# Patient Record
Sex: FEMALE | Race: WHITE | Employment: FULL TIME | URBAN - METROPOLITAN AREA
[De-identification: names, ages, dates, MRNs, and addresses within clinical notes are randomized per-mention and may not be internally consistent; named-entity substitution may affect disease eponyms.]

---

## 2017-01-15 ENCOUNTER — HOSPITAL ENCOUNTER (OUTPATIENT)
Age: 67
Discharge: HOME | End: 2017-01-15
Attending: FAMILY MEDICINE
Payer: MEDICARE

## 2017-01-15 VITALS
RESPIRATION RATE: 19 BRPM | WEIGHT: 230 LBS | TEMPERATURE: 98 F | OXYGEN SATURATION: 99 % | HEIGHT: 65 IN | HEART RATE: 88 BPM | SYSTOLIC BLOOD PRESSURE: 143 MMHG | DIASTOLIC BLOOD PRESSURE: 73 MMHG | BODY MASS INDEX: 38.32 KG/M2

## 2017-01-15 DIAGNOSIS — N30.01 ACUTE CYSTITIS WITH HEMATURIA: Primary | ICD-10-CM

## 2017-01-15 LAB
URINE BILIRUBIN: NEGATIVE
URINE GLUCOSE: NEGATIVE MG/DL
URINE KETONES: NEGATIVE MG/DL
URINE NITRITE: NEGATIVE
URINE PH: 5
URINE SPECIFIC GRAVITY: 1.01
URINE UROBILINOGEN: 0.2 MG/DL

## 2017-01-15 PROCEDURE — 87086 URINE CULTURE/COLONY COUNT: CPT | Performed by: FAMILY MEDICINE

## 2017-01-15 PROCEDURE — 81002 URINALYSIS NONAUTO W/O SCOPE: CPT

## 2017-01-15 PROCEDURE — 99214 OFFICE O/P EST MOD 30 MIN: CPT

## 2017-01-15 RX ORDER — NITROFURANTOIN 25; 75 MG/1; MG/1
100 CAPSULE ORAL 2 TIMES DAILY
Qty: 14 CAPSULE | Refills: 0 | Status: SHIPPED | OUTPATIENT
Start: 2017-01-15 | End: 2017-01-22

## 2017-01-15 NOTE — ED INITIAL ASSESSMENT (HPI)
1/14 c/o dysuria, frequency, and urgency. No hematuria. No fever. Denies abdominal or back pain. No nausea/vomiting.

## 2017-01-15 NOTE — ED PROVIDER NOTES
Patient Seen in: [facility]    History   Patient presents with:  Urinary Symptoms (urologic)    Stated Complaint: UTI  HPI    Patient here with dysuria, urgency and frequency for 1-2 days.   Denies vaginal itching, discharge DX/THERAPEUTIC SUBSTANCE, EPIDURAL/SUBARACHNOID; LUMBAR/SACRAL N/A 8/2/2016    Comment Procedure: LUMBAR EPIDURAL;  Surgeon: Cb Lamas MD;  Location: [address] [place] [address] FOR IV ANTIBIOTIC SURGICAL SIT 165.1 cm (5' 5\")  Wt 104. 327 kg  BMI 38.27 kg/m2  SpO2 99%    GENERAL: overweight habitus, well hydrated, appears comfortable  HEENT:  PERRLA, EOMI, MMM  NECK: supple, no adenopathy  LUNGS:  b/l cta with good air entry  CARDIO: RRR without murmur  GI:  So

## 2017-04-07 PROBLEM — M25.552 ACUTE PAIN OF LEFT HIP: Status: ACTIVE | Noted: 2017-04-07

## 2017-09-28 PROBLEM — M16.12 PRIMARY OSTEOARTHRITIS OF LEFT HIP: Status: ACTIVE | Noted: 2017-09-28

## 2017-09-28 PROBLEM — M17.12 PRIMARY LOCALIZED OSTEOARTHROSIS OF LEFT LOWER LEG: Status: ACTIVE | Noted: 2017-09-28

## 2018-02-14 PROBLEM — G89.29 CHRONIC LEFT-SIDED LOW BACK PAIN WITH LEFT-SIDED SCIATICA: Status: ACTIVE | Noted: 2018-02-14

## 2018-02-14 PROBLEM — M54.42 CHRONIC LEFT-SIDED LOW BACK PAIN WITH LEFT-SIDED SCIATICA: Status: ACTIVE | Noted: 2018-02-14

## 2018-04-17 PROBLEM — Z01.818 PREOPERATIVE EVALUATION TO RULE OUT SURGICAL CONTRAINDICATION: Status: ACTIVE | Noted: 2018-04-17

## 2018-04-24 ENCOUNTER — HOSPITAL (OUTPATIENT)
Dept: OTHER | Age: 68
End: 2018-04-24
Attending: ORTHOPAEDIC SURGERY

## 2018-04-24 LAB
INR PPP: 1
PROTHROMBIN TIME: 10.4 SECONDS (ref 9.7–11.8)
PROTHROMBIN TIME: NORMAL

## 2018-04-25 LAB
ANALYZER ANC (IANC): ABNORMAL
ANION GAP SERPL CALC-SCNC: 10 MMOL/L (ref 10–20)
BUN SERPL-MCNC: 9 MG/DL (ref 6–20)
BUN/CREAT SERPL: 16 (ref 7–25)
CALCIUM SERPL-MCNC: 8.5 MG/DL (ref 8.4–10.2)
CHLORIDE: 104 MMOL/L (ref 98–107)
CO2 SERPL-SCNC: 29 MMOL/L (ref 21–32)
CREAT SERPL-MCNC: 0.58 MG/DL (ref 0.51–0.95)
ERYTHROCYTE [DISTWIDTH] IN BLOOD: 13.8 % (ref 11–15)
GLUCOSE SERPL-MCNC: 103 MG/DL (ref 65–99)
HEMATOCRIT: 28.8 % (ref 36–46.5)
HGB BLD-MCNC: 9.4 GM/DL (ref 12–15.5)
MCH RBC QN AUTO: 28.4 PG (ref 26–34)
MCHC RBC AUTO-ENTMCNC: 32.6 GM/DL (ref 32–36.5)
MCV RBC AUTO: 87 FL (ref 78–100)
PERCENT NRBC: ABNORMAL
PLATELET # BLD: 263 THOUSAND/MCL (ref 140–450)
POTASSIUM SERPL-SCNC: 4.2 MMOL/L (ref 3.4–5.1)
RBC # BLD: 3.31 MILLION/MCL (ref 4–5.2)
SODIUM SERPL-SCNC: 139 MMOL/L (ref 135–145)
WBC # BLD: 9.3 THOUSAND/MCL (ref 4.2–11)

## 2018-05-08 PROCEDURE — 87077 CULTURE AEROBIC IDENTIFY: CPT | Performed by: NURSE PRACTITIONER

## 2018-05-08 PROCEDURE — 87186 SC STD MICRODIL/AGAR DIL: CPT | Performed by: NURSE PRACTITIONER

## 2018-05-08 PROCEDURE — 87086 URINE CULTURE/COLONY COUNT: CPT | Performed by: NURSE PRACTITIONER

## 2018-06-07 PROBLEM — Z96.642 HISTORY OF TOTAL LEFT HIP REPLACEMENT: Status: ACTIVE | Noted: 2018-06-07

## 2018-06-18 PROBLEM — G89.29 CHRONIC LEFT HIP PAIN: Status: ACTIVE | Noted: 2018-06-18

## 2018-06-18 PROBLEM — M25.552 CHRONIC LEFT HIP PAIN: Status: ACTIVE | Noted: 2018-06-18

## 2023-08-02 ENCOUNTER — HOSPITAL ENCOUNTER (OUTPATIENT)
Age: 73
Discharge: HOME | End: 2023-08-02
Attending: EMERGENCY MEDICINE
Payer: MEDICARE

## 2023-08-02 VITALS
HEART RATE: 95 BPM | TEMPERATURE: 98 F | SYSTOLIC BLOOD PRESSURE: 175 MMHG | RESPIRATION RATE: 19 BRPM | DIASTOLIC BLOOD PRESSURE: 97 MMHG | OXYGEN SATURATION: 95 %

## 2023-08-02 DIAGNOSIS — I10 HYPERTENSION, UNSPECIFIED TYPE: ICD-10-CM

## 2023-08-02 DIAGNOSIS — N30.01 ACUTE CYSTITIS WITH HEMATURIA: Primary | ICD-10-CM

## 2023-08-02 LAB
BILIRUB UR QL STRIP: NEGATIVE
COLOR UR: YELLOW
GLUCOSE UR STRIP-MCNC: NEGATIVE MG/DL
KETONES UR STRIP-MCNC: NEGATIVE MG/DL
NITRITE UR QL STRIP: POSITIVE
PH UR STRIP: 5 [PH]
PROT UR STRIP-MCNC: 100 MG/DL
SP GR UR STRIP: 1.02
UROBILINOGEN UR STRIP-ACNC: <2 MG/DL

## 2023-08-02 PROCEDURE — 87186 SC STD MICRODIL/AGAR DIL: CPT | Performed by: EMERGENCY MEDICINE

## 2023-08-02 PROCEDURE — 87086 URINE CULTURE/COLONY COUNT: CPT | Performed by: EMERGENCY MEDICINE

## 2023-08-02 PROCEDURE — 99204 OFFICE O/P NEW MOD 45 MIN: CPT

## 2023-08-02 PROCEDURE — 81002 URINALYSIS NONAUTO W/O SCOPE: CPT

## 2023-08-02 PROCEDURE — 87077 CULTURE AEROBIC IDENTIFY: CPT | Performed by: EMERGENCY MEDICINE

## 2023-08-02 RX ORDER — NITROFURANTOIN 25; 75 MG/1; MG/1
100 CAPSULE ORAL 2 TIMES DAILY
Qty: 14 CAPSULE | Refills: 0 | Status: SHIPPED | OUTPATIENT
Start: 2023-08-02 | End: 2023-08-09

## 2023-09-21 ENCOUNTER — HOSPITAL ENCOUNTER (OUTPATIENT)
Age: 73
Discharge: HOME | End: 2023-09-21
Attending: EMERGENCY MEDICINE
Payer: MEDICARE

## 2023-09-21 VITALS
RESPIRATION RATE: 18 BRPM | SYSTOLIC BLOOD PRESSURE: 151 MMHG | TEMPERATURE: 98 F | DIASTOLIC BLOOD PRESSURE: 80 MMHG | OXYGEN SATURATION: 91 % | HEART RATE: 89 BPM

## 2023-09-21 DIAGNOSIS — N30.00 ACUTE CYSTITIS WITHOUT HEMATURIA: Primary | ICD-10-CM

## 2023-09-21 LAB
BILIRUB UR QL STRIP: NEGATIVE
COLOR UR: YELLOW
GLUCOSE UR STRIP-MCNC: NEGATIVE MG/DL
KETONES UR STRIP-MCNC: NEGATIVE MG/DL
NITRITE UR QL STRIP: POSITIVE
PH UR STRIP: 5.5 [PH]
PROT UR STRIP-MCNC: 30 MG/DL
SP GR UR STRIP: 1.02
UROBILINOGEN UR STRIP-ACNC: <2 MG/DL

## 2023-09-21 PROCEDURE — 81002 URINALYSIS NONAUTO W/O SCOPE: CPT

## 2023-09-21 PROCEDURE — 99213 OFFICE O/P EST LOW 20 MIN: CPT

## 2023-09-21 RX ORDER — SULFAMETHOXAZOLE AND TRIMETHOPRIM 800; 160 MG/1; MG/1
1 TABLET ORAL 2 TIMES DAILY
Qty: 6 TABLET | Refills: 0 | Status: SHIPPED | OUTPATIENT
Start: 2023-09-21 | End: 2023-09-24

## 2023-09-21 RX ORDER — PHENAZOPYRIDINE HYDROCHLORIDE 200 MG/1
200 TABLET, FILM COATED ORAL 3 TIMES DAILY PRN
Qty: 6 TABLET | Refills: 0 | Status: SHIPPED | OUTPATIENT
Start: 2023-09-21 | End: 2023-09-23

## 2023-09-21 NOTE — ED INITIAL ASSESSMENT (HPI)
Patient arrived ambulatory to room c/o symptoms that started yesterday. +burning with urinating +frequency/urgency. No hematuria. No n/v/d. No fevers. No abdominal pain/back pain. Easy non labored respirations. No distress.

## 2024-02-15 ENCOUNTER — HOSPITAL ENCOUNTER (OUTPATIENT)
Age: 74
Discharge: HOME | End: 2024-02-15
Payer: MEDICARE

## 2024-02-15 VITALS
HEART RATE: 102 BPM | OXYGEN SATURATION: 98 % | TEMPERATURE: 99 F | RESPIRATION RATE: 26 BRPM | SYSTOLIC BLOOD PRESSURE: 165 MMHG | DIASTOLIC BLOOD PRESSURE: 85 MMHG

## 2024-02-15 DIAGNOSIS — N30.00 ACUTE CYSTITIS WITHOUT HEMATURIA: Primary | ICD-10-CM

## 2024-02-15 LAB
BILIRUB UR QL STRIP: NEGATIVE
CLARITY UR: CLEAR
COLOR UR: YELLOW
GLUCOSE UR STRIP-MCNC: NEGATIVE MG/DL
KETONES UR STRIP-MCNC: NEGATIVE MG/DL
NITRITE UR QL STRIP: NEGATIVE
PH UR STRIP: 5.5 [PH]
PROT UR STRIP-MCNC: NEGATIVE MG/DL
SP GR UR STRIP: <=1.005
UROBILINOGEN UR STRIP-ACNC: <2 MG/DL

## 2024-02-15 PROCEDURE — 81002 URINALYSIS NONAUTO W/O SCOPE: CPT

## 2024-02-15 PROCEDURE — 99213 OFFICE O/P EST LOW 20 MIN: CPT

## 2024-02-15 PROCEDURE — 99214 OFFICE O/P EST MOD 30 MIN: CPT

## 2024-02-15 RX ORDER — SULFAMETHOXAZOLE AND TRIMETHOPRIM 800; 160 MG/1; MG/1
1 TABLET ORAL 2 TIMES DAILY
Qty: 6 TABLET | Refills: 0 | Status: SHIPPED | OUTPATIENT
Start: 2024-02-15 | End: 2024-02-18

## 2024-02-15 NOTE — ED PROVIDER NOTES
Patient Seen in: [facility]      History     Chief Complaint   Patient presents with    Urinary Symptoms     Stated Complaint: Bladder infection    Subjective:   HPI    74 yo female presenting with concern for UTI. Reports dysuria, urgency and frequency started this AM. Denies fevers, abdominal pain, back pain. Pt feels symptoms are similar to previous UTI.    Objective:   Past Medical History:   Diagnosis Date    Irregular heartbeat     OBESITY     Pulmonary fibrosis (HCC)               Past Surgical History:   Procedure Laterality Date    COLONOSCOPY      FLUOR GID & LOCLZJ NDL/CATH SPI DX/THER NJX N/A 7/27/2015    Procedure: LUMBAR EPIDURAL;  Surgeon: Gustavo Ireland MD;  Location: [facility] FOR PAIN MANAGEMENT    FLUOR GID & LOCLZJ NDL/CATH SPI DX/THER NJX N/A 10/22/2015    Procedure: LUMBAR EPIDURAL;  Surgeon: Gustavo Ireland MD;  Location: [facility]    HIP REPLACEMENT SURGERY Left 04/24/2018    Dr Hayden    INJECTION, W/WO CONTRAST, DX/THERAPEUTIC SUBSTANCE, EPIDURAL/SUBARACHNOID; LUMBAR/SACRAL N/A 7/27/2015    Procedure: LUMBAR EPIDURAL;  Surgeon: Gustavo Ireland MD;  Location: [facility]    INJECTION, W/WO CONTRAST, DX/THERAPEUTIC SUBSTANCE, EPIDURAL/SUBARACHNOID; LUMBAR/SACRAL N/A 10/22/2015    Procedure: LUMBAR EPIDURAL;  Surgeon: Gustavo Ireland MD;  Location: [facility]    INJECTION, W/WO CONTRAST, DX/THERAPEUTIC SUBSTANCE, EPIDURAL/SUBARACHNOID; LUMBAR/SACRAL N/A 7/1/2016    Procedure: LUMBAR EPIDURAL;  Surgeon: Gustavo Ireland MD;  Location: [facility]    INJECTION, W/WO CONTRAST, DX/THERAPEUTIC SUBSTANCE, EPIDURAL/SUBARACHNOID; LUMBAR/SACRAL N/A 8/2/2016    Procedure: LUMBAR EPIDURAL;  Surgeon: Gustavo Ireland MD;  Location: [facility]    PATIENT DOCUMENTED NOT TO HAVE EXPERIENCED ANY OF THE FOLLOWING EVENTS N/A 7/1/2016    Procedure: LUMBAR EPIDURAL;  Surgeon: Gustavo Ireland MD;   Location: [facility] FOR PAIN MANA[facility]    PATIENT DOCUMENTED NOT TO HAVE EXPERIENCED ANY OF THE FOLLOWING EVENTS N/A 8/2/2016    Procedure: LUMBAR EPIDURAL;  Surgeon: Gustavo Ireland MD;  Location: [facility] PAIN MANAGEMENT    PATIENT WITHOUGH PREOPERATIVE ORDER FOR IV ANTIBIOTIC SURGICAL SITE INFECTION PROPHYLAXIS. N/A 7/1/2016    Procedure: LUMBAR EPIDURAL;  Surgeon: Gustavo Ireland MD;  Location: [facility] PAIN MANAGEMENT    PATIENT WITHOUGH PREOPERATIVE ORDER FOR IV ANTIBIOTIC SURGICAL SITE INFECTION PROPHYLAXIS. N/A 8/2/2016    Procedure: LUMBAR EPIDURAL;  Surgeon: Gustavo Ireland MD;  Location: [facility] PAIN MANAGEMENT                Social History     Socioeconomic History    Marital status:    Tobacco Use    Smoking status: Never    Smokeless tobacco: Never   Vaping Use    Vaping Use: Every day   Substance and Sexual Activity    Alcohol use: No    Drug use: No              Review of Systems    Positive for stated complaint: Bladder infection  Other systems are as noted in HPI.  Constitutional and vital signs reviewed.      All other systems reviewed and negative except as noted above.    Physical Exam     ED Triage Vitals [02/15/24 0911]   BP (!) 165/85   Pulse 102   Resp 26   Temp 98.5 °F (36.9 °C)   Temp src Temporal   SpO2 92 %   O2 Device None (Room air)       Current:BP (!) 165/85   Pulse 102   Temp 98.5 °F (36.9 °C) (Temporal)   Resp 26   SpO2 98%         Physical Exam  Vitals and nursing note reviewed.   Constitutional:       General: She is not in acute distress.  HENT:      Head: Normocephalic and atraumatic.      Right Ear: External ear normal.      Left Ear: External ear normal.      Nose: Nose normal.      Mouth/Throat:      Mouth: Mucous membranes are moist.   Eyes:      Extraocular Movements: Extraocular movements intact.      Pupils: Pupils are equal, round, and reactive to light.   Cardiovascular:      Rate and Rhythm: Normal rate.   Pulmonary:      Effort:  Pulmonary effort is normal.   Abdominal:      General: Abdomen is flat.   Musculoskeletal:         General: Normal range of motion.      Cervical back: Normal range of motion.   Skin:     General: Skin is warm.   Neurological:      General: No focal deficit present.      Mental Status: She is alert and oriented to person, place, and time.   Psychiatric:         Mood and Affect: Mood normal.         Behavior: Behavior normal.               ED Course     Labs Reviewed   [facility] POCT URINALYSIS DIPSTICK - Abnormal; Notable for the following components:       Result Value    Blood, Urine Small (*)     Leukocyte esterase urine Moderate (*)     All other components within normal limits         74 yo female presenting with c/o urinary symptoms.  UA with small blood, moderate leuks  Ddx-interstitial cystitis, UTI, pyelonephritis    Dc with bactrim (pt tolerated previously). Return precautions reviewed           MDM                                         Medical Decision Making      Disposition and Plan     Clinical Impression:  1. Acute cystitis without hematuria         Disposition:  Discharge  2/15/2024 10:00 am    Follow-up:  No follow-up provider specified.        Medications Prescribed:  Discharge Medication List as of 2/15/2024 10:00 AM        START taking these medications    Details   sulfamethoxazole-trimethoprim -160 MG Oral Tab per tablet Take 1 tablet by mouth 2 (two) times daily for 3 days., Normal, Disp-6 tablet, R-0

## 2024-12-18 ENCOUNTER — HOSPITAL ENCOUNTER (OUTPATIENT)
Age: 74
Discharge: HOME | End: 2024-12-18
Payer: MEDICARE

## 2024-12-18 VITALS
TEMPERATURE: 97 F | DIASTOLIC BLOOD PRESSURE: 87 MMHG | SYSTOLIC BLOOD PRESSURE: 155 MMHG | OXYGEN SATURATION: 98 % | HEART RATE: 98 BPM | RESPIRATION RATE: 22 BRPM

## 2024-12-18 DIAGNOSIS — N30.01 ACUTE CYSTITIS WITH HEMATURIA: Primary | ICD-10-CM

## 2024-12-18 LAB
BILIRUB UR QL STRIP: NEGATIVE
COLOR UR: YELLOW
GLUCOSE UR STRIP-MCNC: NEGATIVE MG/DL
KETONES UR STRIP-MCNC: 15 MG/DL
NITRITE UR QL STRIP: POSITIVE
PH UR STRIP: 5.5 [PH]
PROT UR STRIP-MCNC: >=300 MG/DL
SP GR UR STRIP: 1.02
UROBILINOGEN UR STRIP-ACNC: <2 MG/DL

## 2024-12-18 PROCEDURE — 99214 OFFICE O/P EST MOD 30 MIN: CPT

## 2024-12-18 PROCEDURE — 81002 URINALYSIS NONAUTO W/O SCOPE: CPT

## 2024-12-18 PROCEDURE — 87186 SC STD MICRODIL/AGAR DIL: CPT | Performed by: NURSE PRACTITIONER

## 2024-12-18 PROCEDURE — 87086 URINE CULTURE/COLONY COUNT: CPT | Performed by: NURSE PRACTITIONER

## 2024-12-18 PROCEDURE — 87088 URINE BACTERIA CULTURE: CPT | Performed by: NURSE PRACTITIONER

## 2024-12-18 RX ORDER — NITROFURANTOIN 25; 75 MG/1; MG/1
100 CAPSULE ORAL 2 TIMES DAILY
Qty: 14 CAPSULE | Refills: 0 | Status: SHIPPED | OUTPATIENT
Start: 2024-12-18 | End: 2024-12-25

## 2024-12-18 NOTE — DISCHARGE INSTRUCTIONS
As discussed, it does appear that you have a bladder infection.  Antibiotic sent to the pharmacy.  Twice a day for 7 days.  Drink plenty water.  It is harder for infection Krob waterfall.    I have sent your urine for culture.  We will have results in about 48 hours.  Some will contact you if antibiotics need to be switched.    If you have any worsening urinary symptoms, blood in your urine, inability to urinate, worsening back pain with associated fevers, chills, fatigue, nausea, vomiting, please go to emergency room.

## 2024-12-18 NOTE — ED INITIAL ASSESSMENT (HPI)
Patient concerned for bladder infection.  Reports back pain, dysuria starting yesterday.  Denies fevers.  Hx of pulmonary fibrosis.

## 2024-12-18 NOTE — ED PROVIDER NOTES
Patient Seen in: [facility]      History     Chief Complaint   Patient presents with    Urinary Symptoms            Stated Complaint: Urinary problem    Subjective: This is a 74-year-old female, past medical history of pulmonary fibrosis (home O2 as needed), presents to immediate care clinic for evaluation of urinary frequency and dysuria.  Positive low back pain.  Patient denies abdominal pain, pelvic pain, flank pain.  No retention or hematuria.  No nausea, vomiting, [place].  No fever, chills, fatigue.  Patient states this feels similar to previous UTIs.  AOx4.  The history is provided by the patient.           Objective:     Past Medical History:    Irregular heartbeat    OBESITY    Pulmonary fibrosis (HCC)              Past Surgical History:   Procedure Laterality Date    Colonoscopy      Fluor gid & loclzj ndl/cath spi dx/ther njx N/A 7/27/2015    Procedure: LUMBAR EPIDURAL;  Surgeon: Gustavo Ireland MD;  Location: [facility] PAIN MANA[facility]    Fluor gid & loclzj ndl/cath spi dx/ther njx N/A 10/22/2015    Procedure: LUMBAR EPIDURAL;  Surgeon: Gustavo Ireland MD;  Location: [facility]IN [facility]NA[facility]    Hip replacement surgery Left 04/24/2018    Dr Hayden    Injection, w/wo contrast, dx/therapeutic substance, epidural/subarachnoid; lumbar/sacral N/A 7/27/2015    Procedure: LUMBAR EPIDURAL;  Surgeon: Gustavo Ireland MD;  Location: [facility] PAIN MANAGEMENT    Injection, w/wo contrast, dx/therapeutic substance, epidural/subarachnoid; lumbar/sacral N/A 10/22/2015    Procedure: LUMBAR EPIDURAL;  Surgeon: Gustavo Ireland MD;  Location: [facility] PAIN MANAGEMENT    Injection, w/wo contrast, dx/therapeutic substance, epidural/subarachnoid; lumbar/sacral N/A 7/1/2016    Procedure: LUMBAR EPIDURAL;  Surgeon: Gustavo Ireland MD;  Location: [facility]IN [facility]    Injection, w/wo contrast, dx/therapeutic substance, epidural/subarachnoid; lumbar/sacral N/A 8/2/2016    Procedure: LUMBAR  EPIDURAL;  Surgeon: Gustavo Ireland MD;  Location: [facility] PAIN MANA[facility]    Patient documented not to have experienced any of the following events N/A 7/1/2016    Procedure: LUMBAR EPIDURAL;  Surgeon: Gustavo Ireland MD;  Location: [facility] PAIN MANAGEMENT    Patient documented not to have experienced any of the following events N/A 8/2/2016    Procedure: LUMBAR EPIDURAL;  Surgeon: Gustavo Ireland MD;  Location: [facility] PAIN MANAGEMENT    Patient withough preoperative order for iv antibiotic surgical site infection prophylaxis. N/A 7/1/2016    Procedure: LUMBAR EPIDURAL;  Surgeon: Gustavo Ireland MD;  Location: [facility] PAIN [facility]    Patient withough preoperative order for iv antibiotic surgical site infection prophylaxis. N/A 8/2/2016    Procedure: LUMBAR EPIDURAL;  Surgeon: Gustavo Ireland MD;  Location: [facility]                No pertinent social history.            Review of Systems   Constitutional: Negative.  Negative for activity change, appetite change, chills, fatigue and fever.   HENT: Negative.     Respiratory: Negative.     Cardiovascular: Negative.    Gastrointestinal: Negative.  Negative for abdominal pain.   Genitourinary:  Positive for dysuria and urgency. Negative for decreased urine volume, difficulty urinating, dyspareunia, enuresis, flank pain, frequency, genital sores, hematuria, menstrual problem, pelvic pain, vaginal bleeding, vaginal discharge and vaginal pain.   Musculoskeletal:  Positive for back pain.   Skin: Negative.    Neurological: Negative.        Positive for stated complaint: Urinary problem  Other systems are as noted in HPI.  Constitutional and vital signs reviewed.      All other systems reviewed and negative except as noted above.    Physical Exam     ED Triage Vitals [12/18/24 1115]   /87   Pulse 98   Resp (!) 28   Temp 97.3 °F (36.3 °C)   Temp src Oral   SpO2 92 %   O2 Device Nasal cannula       Current Vitals:    Vital Signs  BP: 155/87  Pulse: 98  Resp: 22  Temp: 97.3 °F (36.3 °C)  Temp src: Oral    Oxygen Therapy  SpO2: 98 %  O2 Device: Nasal cannula  O2 Flow Rate (L/min): 2 L/min        Physical Exam  Constitutional:       General: She is not in acute distress.     Appearance: Normal appearance. She is not ill-appearing or toxic-appearing.   HENT:      Head: Normocephalic.      Jaw: There is normal jaw occlusion.      Right Ear: External ear normal.      Left Ear: External ear normal.      Nose: Nose normal. No congestion or rhinorrhea.      Mouth/Throat:      Lips: Pink. No lesions.      Mouth: Mucous membranes are moist. No oral lesions.      Tongue: No lesions.      Palate: No lesions.      Pharynx: Uvula midline. No pharyngeal swelling, oropharyngeal exudate, posterior oropharyngeal erythema, uvula swelling or postnasal drip.   Eyes:      General:         Right eye: No discharge.         Left eye: No discharge.      Extraocular Movements: Extraocular movements intact.      Pupils: Pupils are equal, round, and reactive to light.   Cardiovascular:      Rate and Rhythm: Normal rate and regular rhythm.      Pulses: Normal pulses.      Heart sounds: Normal heart sounds.   Pulmonary:      Effort: Pulmonary effort is normal.      Breath sounds: Normal breath sounds.   Abdominal:      General: Abdomen is flat. Bowel sounds are normal. There is no distension.      Palpations: Abdomen is soft.      Tenderness: There is no abdominal tenderness. There is no right CVA tenderness, left CVA tenderness, guarding or rebound.   Musculoskeletal:         General: Normal range of motion.      Cervical back: Normal range of motion.   Skin:     General: Skin is warm.      Capillary Refill: Capillary refill takes less than 2 seconds.   Neurological:      General: No focal deficit present.      Mental Status: She is alert and oriented to person, place, and time.             ED Course     Labs Reviewed   [facility] POCT URINALYSIS DIPSTICK -  Abnormal; Notable for the following components:       Result Value    Urine Clarity Cloudy (*)     Protein urine >=300 (*)     Ketone, Urine 15 (*)     Blood, Urine Large (*)     Nitrite Urine Positive (*)     Leukocyte esterase urine Small (*)     All other components within normal limits   URINE CULTURE, ROUTINE                   MDM      Differentials considered: Acute cystitis with hematuria, acute cystitis without hematuria, pyelonephritis.     Patient with no complaints of abdominal pain, flank pain, pelvic pain.  Bilateral lower back pain.  Afebrile.  No nausea or vomiting.  No urinary retention or hematuria.  Very low suspicion for pyelonephritis.  No suprapubic assessment on exam.  No suprapubic discomfort on palpation.  No CVA tenderness to percussion.    Patient urine sample provided with small amount of leukocytes, positive nitrites, positive blood.  Small amount of ketones and protein.    Educated patient on acute cystitis with hematuria.  Previous urine culture from last year grew out E. coli.  Patient allergic to penicillins.  No known knowledge if she is tolerated cephalosporins in the past.  Chart does not yield any results.  Patient is unsure of herself.    Will prescribe Macrobid twice a day for 7 days.    Patient is aware to drink plenty water and get plenty of rest.    Educated patient on signs symptoms that warrant immediate ER evaluation.  She verbalized understand agrees with plan of care.                Medical Decision Making      Disposition and Plan     Clinical Impression:  1. Acute cystitis with hematuria         Disposition:  Discharge  12/18/2024 11:47 am    Follow-up:  Radha Mehta DO  [address] [place] [id number]  [phone]      As needed          Medications Prescribed:  Discharge Medication List as of 12/18/2024 11:47 AM        START taking these medications    Details   nitrofurantoin monohydrate macro 100 MG Oral Cap Take 1 capsule (100 mg total) by mouth 2  (two) times daily for 7 days., Normal, Disp-14 capsule, R-0                 Supplementary Documentation:

## 2025-03-17 ENCOUNTER — HOSPITAL ENCOUNTER (OUTPATIENT)
Age: 75
Discharge: HOME | End: 2025-03-17
Attending: STUDENT IN AN ORGANIZED HEALTH CARE EDUCATION/TRAINING PROGRAM
Payer: MEDICARE

## 2025-03-17 VITALS
OXYGEN SATURATION: 93 % | RESPIRATION RATE: 24 BRPM | DIASTOLIC BLOOD PRESSURE: 87 MMHG | TEMPERATURE: 97 F | HEART RATE: 82 BPM | SYSTOLIC BLOOD PRESSURE: 150 MMHG

## 2025-03-17 DIAGNOSIS — N30.00 ACUTE CYSTITIS WITHOUT HEMATURIA: Primary | ICD-10-CM

## 2025-03-17 LAB
BILIRUB UR QL STRIP: NEGATIVE
COLOR UR: YELLOW
GLUCOSE UR STRIP-MCNC: NEGATIVE MG/DL
KETONES UR STRIP-MCNC: 15 MG/DL
NITRITE UR QL STRIP: NEGATIVE
PH UR STRIP: 5.5 [PH]
PROT UR STRIP-MCNC: 100 MG/DL
SP GR UR STRIP: 1.02
UROBILINOGEN UR STRIP-ACNC: <2 MG/DL

## 2025-03-17 PROCEDURE — 99214 OFFICE O/P EST MOD 30 MIN: CPT

## 2025-03-17 PROCEDURE — 87186 SC STD MICRODIL/AGAR DIL: CPT | Performed by: STUDENT IN AN ORGANIZED HEALTH CARE EDUCATION/TRAINING PROGRAM

## 2025-03-17 PROCEDURE — 87088 URINE BACTERIA CULTURE: CPT | Performed by: STUDENT IN AN ORGANIZED HEALTH CARE EDUCATION/TRAINING PROGRAM

## 2025-03-17 PROCEDURE — 81002 URINALYSIS NONAUTO W/O SCOPE: CPT

## 2025-03-17 PROCEDURE — 87086 URINE CULTURE/COLONY COUNT: CPT | Performed by: STUDENT IN AN ORGANIZED HEALTH CARE EDUCATION/TRAINING PROGRAM

## 2025-03-17 RX ORDER — SULFAMETHOXAZOLE AND TRIMETHOPRIM 800; 160 MG/1; MG/1
1 TABLET ORAL 2 TIMES DAILY
Qty: 6 TABLET | Refills: 0 | Status: SHIPPED | OUTPATIENT
Start: 2025-03-17 | End: 2025-03-20

## 2025-03-17 NOTE — DISCHARGE INSTRUCTIONS
Your story and urine sample are consistent with a urinary tract infection for which I have prescribed antibiotics.  Symptoms should begin to improve within 72 hours on antibiotics, if there is no improvement, or if you develop any new, changing, or progressing signs or symptoms, please follow-up immediately with your primary care physician.    Even in the setting of antibiotics, infections can continue to progress.  If you notice any back pain, abdominal pain, fevers, vomiting, lethargy, dehydration, or any other concerns, please present immediately to the emergency department for assessment.

## 2025-03-17 NOTE — ED INITIAL ASSESSMENT (HPI)
Patient arrived per wheelchair to room. Symptoms started today. +pain with urinating. +urinary frequency/urgency. No hematuria. No n/v/d. No fevers. No abdominal pain/back pain.

## 2025-03-17 NOTE — ED PROVIDER NOTES
Patient Seen in: [facility]      History     Chief Complaint   Patient presents with    Urinary Symptoms     Stated Complaint: urinary issue    Subjective:   HPI      74-year-old female with past medical history of pulmonary fibrosis intermittently on home oxygen as well as only medication she reports taking daily is Ofev, not currently on her home O2, who presents with concern for dysuria since yesterday.  She denies any hematuria, back pain, fevers, abdominal pain, or vomiting.  Per chart review, patient presented with UTI on 12/20/2024 which grew greater than 100,000 CFU E. coli which was pansensitive with no resistance patterns.  Patient reports only antibiotic allergy is to penicillin.  She was initiated on Macrobid and states symptoms resolved with Macrobid.  She denies any history of recurrent UTIs.  She denies any history of kidney disease.  Per chart review from her CMP from 9/21/2022, which is her most recent CMP, she had normal kidney function.  She denies any history of kidney disease.  Per chart review from 2/15/2024, patient was treated with Bactrim for UTI.    Objective:     Past Medical History:    Irregular heartbeat    OBESITY    Pulmonary fibrosis (HCC)              Past Surgical History:   Procedure Laterality Date    Colonoscopy      Fluor gid & loclzj ndl/cath spi dx/ther njx N/A 7/27/2015    Procedure: LUMBAR EPIDURAL;  Surgeon: Gustavo Ireland MD;  Location: [facility]    Fluor gid & loclzj ndl/cath spi dx/ther njx N/A 10/22/2015    Procedure: LUMBAR EPIDURAL;  Surgeon: Gustavo Ireland MD;  Location: [facility]    Hip replacement surgery Left 04/24/2018    Dr Hayden    Injection, w/wo contrast, dx/therapeutic substance, epidural/subarachnoid; lumbar/sacral N/A 7/27/2015    Procedure: LUMBAR EPIDURAL;  Surgeon: Gustavo Ireland MD;  Location: [facility]    Injection, w/wo contrast, dx/therapeutic substance,  epidural/subarachnoid; lumbar/sacral N/A 10/22/2015    Procedure: LUMBAR EPIDURAL;  Surgeon: Gustavo Ireland MD;  Location: [facility]    Injection, w/wo contrast, dx/therapeutic substance, epidural/subarachnoid; lumbar/sacral N/A 7/1/2016    Procedure: LUMBAR EPIDURAL;  Surgeon: Gustavo Ireland MD;  Location: [facility]    Injection, w/wo contrast, dx/therapeutic substance, epidural/subarachnoid; lumbar/sacral N/A 8/2/2016    Procedure: LUMBAR EPIDURAL;  Surgeon: Gustavo Ireland MD;  Location: [facility]    Patient documented not to have experienced any of the following events N/A 7/1/2016    Procedure: LUMBAR EPIDURAL;  Surgeon: Gustavo Ireland MD;  Location: [facility]    Patient documented not to have experienced any of the following events N/A 8/2/2016    Procedure: LUMBAR EPIDURAL;  Surgeon: Gustavo Ireland MD;  Location: [facility]    Patient withough preoperative order for iv antibiotic surgical site infection prophylaxis. N/A 7/1/2016    Procedure: LUMBAR EPIDURAL;  Surgeon: Gustavo Ireland MD;  Location: [facility]    Patient withough preoperative order for iv antibiotic surgical site infection prophylaxis. N/A 8/2/2016    Procedure: LUMBAR EPIDURAL;  Surgeon: Gustavo Ireland MD;  Location: [facility]                Social History     Socioeconomic History    Marital status:    Tobacco Use    Smoking status: Never    Smokeless tobacco: Never   Vaping Use    Vaping status: Every Day   Substance and Sexual Activity    Alcohol use: No    Drug use: No              Review of Systems    Positive for stated complaint: urinary issue  Other systems are as noted in HPI.  Constitutional and vital signs reviewed.      All other systems reviewed and negative except as noted above.    Physical Exam     ED Triage Vitals [03/17/25 0958]   /87   Pulse 82   Resp 24   Temp  97.1 °F (36.2 °C)   Temp src    SpO2 93 %   O2 Device None (Room air)       Current Vitals:   Vital Signs  BP: 150/87  Pulse: 82  Resp: 24  Temp: 97.1 °F (36.2 °C)    Oxygen Therapy  SpO2: 93 %  O2 Device: None (Room air)        Physical Exam    General: Awake, alert, comfortable on room air, in no distress, tolerating oral secretions, interactive  Pulmonary: No wheezing, no conversational dyspnea, did not bring her home O2 and declined oxygen at immediate care per the nurse  GI: Abdomen soft, nontender, nondistended, no rebound, no guarding  Neuro: Symmetrical facial expressions on gross observation  Musculoskeletal: No B/L CVA tenderness  HEENT: No periorbital edema or erythema  Psych: Normal mood, normal affect    ED Course     Labs Reviewed   [facility] POCT URINALYSIS DIPSTICK - Abnormal; Notable for the following components:       Result Value    Urine Clarity Cloudy (*)     Protein urine 100 (*)     Ketone, Urine 15 (*)     Blood, Urine Moderate (*)     Leukocyte esterase urine Small (*)     All other components within normal limits   URINE CULTURE, ROUTINE       MDM   Patient is awake and alert, comfortable on room air, in no distress, afebrile, no abdominal tenderness, no CVA tenderness, no sign of septic nephrolithiasis or pyelonephritis, patient reports only symptom is dysuria, consistent with likely UTI, symptoms since yesterday, no fevers to suggest systemic symptoms  -Patient's urine dip does show small leukocyte esterases and large blood although patient denies gross hematuria, and I visually assessed the urine sample which is hazy but with no gross hematuria, urine dip is consistent with UTI  -Will send urine culture  -Only antibiotic allergy reported by the patient is penicillins, will avoid penicillins and cephalosporins  -Per chart review from 12/20/2024, which is the last time patient presented with UTI symptoms, she grew E. coli which was pansensitive, and pt states Macrobid did resolve her symptoms.   She was also assessed on 2/15/2024 for UTI symptoms, no urine culture, but was treated with Bactrim, and urine culture from 12/20/2024 does show sensitivity to Bactrim.  -Given Macrobid is bacteriostatic, and given patient's age, and recent Macrobid use, would initiate Bactrim at this time.  She denies any history of kidney disease.  Per chart review of most recent CMP from 9/21/2022 showed normal kidney function.  She reports the only daily medication she takes is Ofev, no interactions per review on up-to-date with Bactrim.  Patient also prefers different medication than Macrobid given she was on Macrobid for the most recent UTI.  -I encouraged the patient to maintain good hydration with water  -We discussed urology assessment if patient has recurrent UTIs, she declines at this time, does not feel her UTIs are recurrent.  I did encourage follow-up with her primary care physician.  -We did discuss that even in the settings of antibiotics, infections can still spread, progress, develop complications, and therefore if there is no improvement over 72 hours on antibiotics or with any new, changing, or progressing signs or symptoms, I did recommend immediate reassessment.  -Currently, no sign of pyelonephritis, but ED precautions discussed    Medical Decision Making  Amount and/or Complexity of Data Reviewed  External Data Reviewed: labs.     Details: Immediate care note and urine culture from 12/20/2024, immediate care note and urine dip from 2/15/2024, CMP from 9/21/2022  Labs: ordered.    Risk  Prescription drug management.        Disposition and Plan     Clinical Impression:  1. Acute cystitis without hematuria         Disposition:  Discharge  3/17/2025 10:29 am    Follow-up:  Buster Jiang,   [address]  [facility] [address]  [phone]    In 3 days  As needed, If symptoms worsen          Medications Prescribed:  Discharge Medication List as of 3/17/2025 10:29 AM             sulfamethoxazole-trimethoprim -160 MG Oral Tab per tablet 6 tablet 0 3/17/2025 3/20/2025   Sig:   Take 1 tablet by mouth 2 (two) times daily for 3 days.     Route:   Oral

## 2025-06-09 ENCOUNTER — HOSPITAL ENCOUNTER (OUTPATIENT)
Age: 75
Discharge: HOME | End: 2025-06-09
Payer: MEDICARE

## 2025-06-09 VITALS
DIASTOLIC BLOOD PRESSURE: 74 MMHG | TEMPERATURE: 98 F | HEART RATE: 92 BPM | RESPIRATION RATE: 24 BRPM | OXYGEN SATURATION: 92 % | SYSTOLIC BLOOD PRESSURE: 142 MMHG

## 2025-06-09 DIAGNOSIS — R31.9 URINARY TRACT INFECTION WITH HEMATURIA, SITE UNSPECIFIED: Primary | ICD-10-CM

## 2025-06-09 DIAGNOSIS — N39.0 URINARY TRACT INFECTION WITH HEMATURIA, SITE UNSPECIFIED: Primary | ICD-10-CM

## 2025-06-09 LAB
BILIRUB UR QL STRIP: NEGATIVE
COLOR UR: YELLOW
GLUCOSE UR STRIP-MCNC: NEGATIVE MG/DL
NITRITE UR QL STRIP: POSITIVE
PH UR STRIP: 5.5 [PH]
PROT UR STRIP-MCNC: >=300 MG/DL
SP GR UR STRIP: >=1.03
UROBILINOGEN UR STRIP-ACNC: <2 MG/DL

## 2025-06-09 PROCEDURE — 99213 OFFICE O/P EST LOW 20 MIN: CPT

## 2025-06-09 PROCEDURE — 81002 URINALYSIS NONAUTO W/O SCOPE: CPT

## 2025-06-09 PROCEDURE — 99214 OFFICE O/P EST MOD 30 MIN: CPT

## 2025-06-09 PROCEDURE — 87186 SC STD MICRODIL/AGAR DIL: CPT | Performed by: PHYSICIAN ASSISTANT

## 2025-06-09 PROCEDURE — 87077 CULTURE AEROBIC IDENTIFY: CPT | Performed by: PHYSICIAN ASSISTANT

## 2025-06-09 PROCEDURE — 87086 URINE CULTURE/COLONY COUNT: CPT | Performed by: PHYSICIAN ASSISTANT

## 2025-06-09 RX ORDER — SULFAMETHOXAZOLE AND TRIMETHOPRIM 800; 160 MG/1; MG/1
1 TABLET ORAL 2 TIMES DAILY
Qty: 14 TABLET | Refills: 0 | Status: SHIPPED | OUTPATIENT
Start: 2025-06-09 | End: 2025-06-16

## 2025-06-09 NOTE — DISCHARGE INSTRUCTIONS
Take PROBIOTIC WITH ANTIBIOTIC BY RECOMMENDATION FOR GUT SUPPORT.     READDRESS WITH PRIMARY OVER DAYS AHEAD WITH CULTURE TO RESULT.     GO TO ER FOR BREAKTHROUGH CHANGES/CONCERNS BY INSTRUCTION.

## 2025-06-09 NOTE — ED PROVIDER NOTES
Chief Complaint   Patient presents with    Urinary Symptoms              HPI:     Manpreet Rincon is a 74 year old female who presents for evaluation of dysuria polyuria overnight, notes previous history of UTI without recent antibiotic in the last month.  Denies associated fever chills headache dizziness neck pain chest pain shortness of breath flank pain hematuria vaginal bleeding discharge weakness numbness  OR Abdominal pain pelvic pain      PFSH    PFSH asessment screens reviewed and agree.  Nurses notes reviewed I agree with documentation.    Family History[1]  Family history reviewed with patient/caregiver and is not pertinent to presenting problem.  Social History     Socioeconomic History    Marital status:      Spouse name: Not on file    Number of children: Not on file    Years of education: Not on file    Highest education level: Not on file   Occupational History    Not on file   Tobacco Use    Smoking status: Never    Smokeless tobacco: Never   Vaping Use    Vaping status: Every Day   Substance and Sexual Activity    Alcohol use: No    Drug use: No    Sexual activity: Not on file   Other Topics Concern    Not on file   Social History Narrative    Not on file     Social Drivers of Health     Food Insecurity: Not on file   Transportation Needs: Not on file   Housing Stability: Not on file         ROS:   Positive for stated complaint: Dysuria polyuria.  All other systems reviewed and negative except as noted above.  Constitutional and Vital Signs Reviewed.      Physical Exam:     Findings:    /74   Pulse 92   Temp 97.7 °F (36.5 °C) (Oral)   Resp 24   SpO2 92%   GENERAL: well developed, well nourished, well hydrated, no distress  SKIN: good skin turgor, no obvious rashes  EXTREMITIES: no cyanosis or edema. AYON without difficulty  GI: no CVA tenderness no adnexal tenderness.  Abdomen soft nontender., normal bowel sounds  HEAD: normocephalic, atraumatic  EYES: sclera non icteric bilateral,  conjunctiva clear  NEURO: No focal deficits  PSYCH: Alert and oriented x3.  Answering questions appropriately.  Mood appropriate.    MDM/Assessment/Plan:   Orders for this encounter:    Orders Placed This Encounter    POCT Urinalysis Dipstick    Urine Culture, Routine     Specimen source::   Urine, clean catch     Documentation of symptoms of UTI or sepsis::   Documentation of symptoms of UTI or sepsis must be corroborated within the EMR     Release to patient:   Immediate    POCT Urine Dip    sulfamethoxazole-trimethoprim -160 MG Oral Tab per tablet     Sig: Take 1 tablet by mouth 2 (two) times daily for 7 days.     Dispense:  14 tablet     Refill:  0       Labs performed this visit:  Recent Results (from the past 10 hours)   POCT Urinalysis Dipstick    Collection Time: 06/09/25 10:36 AM   Result Value Ref Range    Urine Color Yellow Yellow    Urine Clarity Cloudy (A) Clear    Specific Gravity, Urine >=1.030 1.005 - 1.030    PH, Urine 5.5 5.0 - 8.0    Protein urine >=300 (A) Negative mg/dL    Glucose, Urine Negative Negative mg/dL    Ketone, Urine Trace (A) Negative mg/dL    Bilirubin, Urine Negative Negative    Blood, Urine Large (A) Negative    Nitrite Urine Positive (A) Negative    Urobilinogen urine <2.0 <2.0 mg/dL    Leukocyte esterase urine Trace (A) Negative       MDM:  Patient urinalysis suggestive of UTI with positive nitrates and leukocytes with microscopic hematuria.  Will cover empirically based on previous growth including E. coli and Klebsiella with pan sensitivity outside of penicillin with allergy history.  Will cover with Bactrim as previously given without issue based on subjective discussion with patient.  Culture was sent, happy with plan of care will readdress outpatient versus emergently for changes instructed.    Diagnosis:    ICD-10-CM    1. Urinary tract infection with hematuria, site unspecified  N39.0     R31.9           All results reviewed and discussed with patient.  See AVS for  detailed discharge instructions for your condition today.    Follow Up with:  Buster Jiang, DO  [address] [address] [phone]    Schedule an appointment as soon as possible for a visit in 3 days           [1]   Family History  Problem Relation Age of Onset    Cancer Father         colon    Cancer Mother         lung

## (undated) NOTE — ED AVS SNAPSHOT
[facility] in [address] [address] [place]    Phone:  [phone]    Fax:  [phone]           Raquel Diaz   MRN: [id number]    Department:  [facility] in [address] [place] [address]   Date of Visit:  1/1 instructed with your primary care doctor, please return to immediate care.      Discharge References/Attachments     URINARY TRACT INFECTIONS (UTIS), UNDERSTANDING (ENGLISH)      Disclosure     Insurance plans vary and the physician(s) referred by the Immed Registration line at [phone] or find a doctor online by visiting [email or web address].    IF THERE IS ANY CHANGE OR WORSENING OF YOUR CONDITION, CALL YOUR PRIMARY CARE PHYSICIAN AT ONCE OR GO TO [address] [place] [address].     If you have been prescribed a - If you have concerns related to behavioral health issues or thoughts of harming yourself, contact [address] [place] [address] at [phone].     - If you don’t have insurance, [facility] has partnered with [facility] [employer]